# Patient Record
Sex: MALE
[De-identification: names, ages, dates, MRNs, and addresses within clinical notes are randomized per-mention and may not be internally consistent; named-entity substitution may affect disease eponyms.]

---

## 2021-12-01 DIAGNOSIS — Z00.00 ENCOUNTER FOR GENERAL ADULT MEDICAL EXAMINATION W/OUT ABNORMAL FINDINGS: ICD-10-CM

## 2021-12-03 ENCOUNTER — APPOINTMENT (OUTPATIENT)
Dept: ORTHOPEDIC SURGERY | Facility: CLINIC | Age: 61
End: 2021-12-03
Payer: COMMERCIAL

## 2021-12-03 ENCOUNTER — NON-APPOINTMENT (OUTPATIENT)
Age: 61
End: 2021-12-03

## 2021-12-03 VITALS
HEART RATE: 72 BPM | HEIGHT: 71 IN | WEIGHT: 180 LBS | SYSTOLIC BLOOD PRESSURE: 120 MMHG | DIASTOLIC BLOOD PRESSURE: 72 MMHG | BODY MASS INDEX: 25.2 KG/M2

## 2021-12-03 PROCEDURE — 99204 OFFICE O/P NEW MOD 45 MIN: CPT

## 2021-12-03 NOTE — HISTORY OF PRESENT ILLNESS
[de-identified] : This is very nice 61-year-old gentleman experiencing left hip and groin pain, which is intermittent and moderate in intensity. The pain does limits activities of daily living. Walking tolerance is somewhat reduced.  Ambulating with a cane.  Takes Tylenol which is not helping.  Is on Xarelto for history of DVT so he cannot take NSAIDs.  The pain started atraumatically and acutely 2 months ago.  He is not tried physical therapy.  The patient denies any radiation of the pain to the feet and it is not associated with numbness, tingling, or weakness.

## 2021-12-03 NOTE — PHYSICAL EXAM
[de-identified] : Patient is well nourished, well-developed, in no acute distress, with appropriate mood and affect. The patient is oriented to time, place, and person. Respirations are even and unlabored. Gait evaluation reveals a limp. There is no inguinal adenopathy. Examination of the contralateral hip shows normal range of motion, strength, no tenderness, and intact skin. The affected limb is well-perfused, shows a grossly normal motor and sensory examination. Examination of the hip shows no skin lesions. Hip motion is reduced and causes pain. FADIR is positive and STAR is positive. Stinchfield test is positive. Leg lengths are approximately equal. Both hips are stable and muscle strength is normal. Pedal pulses are palpable. [de-identified] : AP and lateral x-rays of the left hip, pelvis, and femur were brought in by the patient which I reviewed and demonstrate mild degenerative joint disease of the hip with joint space narrowing, osteophyte formation, and subchondral sclerosis.\par \par The patient brings with him an MRI of the left hip which I reviewed and read this as demonstrates increased edema in the proximal aspect of the left hip without any evidence of fracture.  No evidence of collapse of the femoral head.

## 2021-12-03 NOTE — DISCUSSION/SUMMARY
[de-identified] : This patient has proximal left femur edema on MRI and mild left hip osteoarthritis.  This diagnosis could be representative of transient osteoporosis of the hip versus osteoarthritis progression versus early onset osteonecrosis.  These were discussed with the patient.  The patient is not an appropriate candidate for surgical intervention at this time. An extensive discussion was conducted on the natural history of the disease and the variety of surgical and non-surgical options available to the patient including, but not limited to non-steroidal anti-inflammatory medications, steroid injections, physical therapy, maintenance of ideal body weight, and reduction of activity.  We will continue to monitor the patient for now and reimage him in 3 months to see if there is evidence of collapse of the femoral head consistent with osteonecrosis, progression of osteoarthritis or look for resolution of transient osteoporosis of the hip.  He understands that if it is the latter diagnosis it could take 6 to 9 months to resolve.  In the meantime he should use a cane and decreases weightbearing on that side and limit his long distance ambulation. The patient will schedule an appointment as needed in 3 months.\par

## 2022-02-15 ENCOUNTER — OUTPATIENT (OUTPATIENT)
Dept: OUTPATIENT SERVICES | Facility: HOSPITAL | Age: 62
LOS: 1 days | Discharge: HOME | End: 2022-02-15

## 2022-02-15 DIAGNOSIS — I74.9 EMBOLISM AND THROMBOSIS OF UNSPECIFIED ARTERY: ICD-10-CM

## 2022-02-17 ENCOUNTER — RESULT REVIEW (OUTPATIENT)
Age: 62
End: 2022-02-17

## 2022-02-17 ENCOUNTER — APPOINTMENT (OUTPATIENT)
Dept: HEMATOLOGY ONCOLOGY | Facility: CLINIC | Age: 62
End: 2022-02-17
Payer: COMMERCIAL

## 2022-02-17 VITALS
BODY MASS INDEX: 26.83 KG/M2 | SYSTOLIC BLOOD PRESSURE: 121 MMHG | RESPIRATION RATE: 14 BRPM | OXYGEN SATURATION: 98 % | HEIGHT: 70.24 IN | HEART RATE: 81 BPM | WEIGHT: 187.39 LBS | DIASTOLIC BLOOD PRESSURE: 79 MMHG | TEMPERATURE: 97.3 F

## 2022-02-17 DIAGNOSIS — I82.409 ACUTE EMBOLISM AND THROMBOSIS OF UNSPECIFIED DEEP VEINS OF UNSPECIFIED LOWER EXTREMITY: ICD-10-CM

## 2022-02-17 DIAGNOSIS — Z86.39 PERSONAL HISTORY OF OTHER ENDOCRINE, NUTRITIONAL AND METABOLIC DISEASE: ICD-10-CM

## 2022-02-17 DIAGNOSIS — Z82.5 FAMILY HISTORY OF ASTHMA AND OTHER CHRONIC LOWER RESPIRATORY DISEASES: ICD-10-CM

## 2022-02-17 DIAGNOSIS — Z80.0 FAMILY HISTORY OF MALIGNANT NEOPLASM OF DIGESTIVE ORGANS: ICD-10-CM

## 2022-02-17 DIAGNOSIS — Z85.828 PERSONAL HISTORY OF OTHER MALIGNANT NEOPLASM OF SKIN: ICD-10-CM

## 2022-02-17 DIAGNOSIS — Z87.19 PERSONAL HISTORY OF OTHER DISEASES OF THE DIGESTIVE SYSTEM: ICD-10-CM

## 2022-02-17 DIAGNOSIS — E78.00 PURE HYPERCHOLESTEROLEMIA, UNSPECIFIED: ICD-10-CM

## 2022-02-17 DIAGNOSIS — D68.59 OTHER PRIMARY THROMBOPHILIA: ICD-10-CM

## 2022-02-17 DIAGNOSIS — Z87.442 PERSONAL HISTORY OF URINARY CALCULI: ICD-10-CM

## 2022-02-17 DIAGNOSIS — Z87.438 PERSONAL HISTORY OF OTHER DISEASES OF MALE GENITAL ORGANS: ICD-10-CM

## 2022-02-17 DIAGNOSIS — K90.0 CELIAC DISEASE: ICD-10-CM

## 2022-02-17 DIAGNOSIS — Z78.9 OTHER SPECIFIED HEALTH STATUS: ICD-10-CM

## 2022-02-17 DIAGNOSIS — Z82.49 FAMILY HISTORY OF ISCHEMIC HEART DISEASE AND OTHER DISEASES OF THE CIRCULATORY SYSTEM: ICD-10-CM

## 2022-02-17 LAB
BASOPHILS # BLD AUTO: 0.03 K/UL — SIGNIFICANT CHANGE UP (ref 0–0.2)
BASOPHILS NFR BLD AUTO: 0.7 % — SIGNIFICANT CHANGE UP (ref 0–2)
EOSINOPHIL # BLD AUTO: 0.14 K/UL — SIGNIFICANT CHANGE UP (ref 0–0.5)
EOSINOPHIL NFR BLD AUTO: 3.3 % — SIGNIFICANT CHANGE UP (ref 0–6)
HCT VFR BLD CALC: 46.2 % — SIGNIFICANT CHANGE UP (ref 39–50)
HGB BLD-MCNC: 15.9 G/DL — SIGNIFICANT CHANGE UP (ref 13–17)
IMM GRANULOCYTES NFR BLD AUTO: 0.2 % — SIGNIFICANT CHANGE UP (ref 0–1.5)
LYMPHOCYTES # BLD AUTO: 0.95 K/UL — LOW (ref 1–3.3)
LYMPHOCYTES # BLD AUTO: 22.6 % — SIGNIFICANT CHANGE UP (ref 13–44)
MCHC RBC-ENTMCNC: 32.6 PG — SIGNIFICANT CHANGE UP (ref 27–34)
MCHC RBC-ENTMCNC: 34.4 G/DL — SIGNIFICANT CHANGE UP (ref 32–36)
MCV RBC AUTO: 94.7 FL — SIGNIFICANT CHANGE UP (ref 80–100)
MONOCYTES # BLD AUTO: 0.36 K/UL — SIGNIFICANT CHANGE UP (ref 0–0.9)
MONOCYTES NFR BLD AUTO: 8.6 % — SIGNIFICANT CHANGE UP (ref 2–14)
NEUTROPHILS # BLD AUTO: 2.72 K/UL — SIGNIFICANT CHANGE UP (ref 1.8–7.4)
NEUTROPHILS NFR BLD AUTO: 64.6 % — SIGNIFICANT CHANGE UP (ref 43–77)
NRBC # BLD: 0 /100 WBCS — SIGNIFICANT CHANGE UP (ref 0–0)
PLATELET # BLD AUTO: 136 K/UL — LOW (ref 150–400)
RBC # BLD: 4.88 M/UL — SIGNIFICANT CHANGE UP (ref 4.2–5.8)
RBC # FLD: 11.6 % — SIGNIFICANT CHANGE UP (ref 10.3–14.5)
WBC # BLD: 4.21 K/UL — SIGNIFICANT CHANGE UP (ref 3.8–10.5)
WBC # FLD AUTO: 4.21 K/UL — SIGNIFICANT CHANGE UP (ref 3.8–10.5)

## 2022-02-17 PROCEDURE — 99204 OFFICE O/P NEW MOD 45 MIN: CPT

## 2022-02-23 LAB
DNA PLOIDY SPEC FC-IMP: NORMAL
PTR INTERP: NORMAL

## 2022-02-26 PROBLEM — Z78.9 SOCIAL ALCOHOL USE: Status: ACTIVE | Noted: 2022-02-26

## 2022-02-26 PROBLEM — Z82.49 FAMILY HISTORY OF CARDIAC DISORDER: Status: ACTIVE | Noted: 2022-02-26

## 2022-02-26 PROBLEM — Z86.39 HISTORY OF HYPERCHOLESTEROLEMIA: Status: RESOLVED | Noted: 2022-02-26 | Resolved: 2022-02-26

## 2022-02-26 PROBLEM — Z80.0 FAMILY HISTORY OF COLORECTAL CANCER: Status: ACTIVE | Noted: 2022-02-26

## 2022-02-26 PROBLEM — Z87.438 HISTORY OF BENIGN PROSTATIC HYPERPLASIA: Status: RESOLVED | Noted: 2022-02-26 | Resolved: 2022-02-26

## 2022-02-26 PROBLEM — Z82.5 FAMILY HISTORY OF CHRONIC OBSTRUCTIVE PULMONARY DISEASE: Status: ACTIVE | Noted: 2022-02-26

## 2022-02-26 PROBLEM — K90.0 ADULT CELIAC DISEASE: Status: RESOLVED | Noted: 2022-02-26 | Resolved: 2022-02-26

## 2022-02-26 PROBLEM — Z85.828: Status: RESOLVED | Noted: 2022-02-26 | Resolved: 2022-02-26

## 2022-02-26 RX ORDER — DOXAZOSIN 1 MG/1
1 TABLET ORAL DAILY
Refills: 0 | Status: ACTIVE | COMMUNITY

## 2022-02-26 RX ORDER — RIVAROXABAN 10 MG/1
10 TABLET, FILM COATED ORAL
Refills: 0 | Status: ACTIVE | COMMUNITY

## 2022-02-26 RX ORDER — ATORVASTATIN CALCIUM 80 MG/1
TABLET, FILM COATED ORAL DAILY
Refills: 0 | Status: ACTIVE | COMMUNITY

## 2022-03-02 PROBLEM — Z87.438 HISTORY OF BPH: Status: ACTIVE | Noted: 2022-03-02

## 2022-03-02 PROBLEM — Z87.19 HISTORY OF CELIAC DISEASE: Status: ACTIVE | Noted: 2022-03-02

## 2022-03-02 PROBLEM — D68.59 HYPERCOAGULABLE STATE: Status: ACTIVE | Noted: 2022-03-02

## 2022-03-02 PROBLEM — I82.409 DVT (DEEP VENOUS THROMBOSIS): Status: ACTIVE | Noted: 2022-02-17

## 2022-03-02 PROBLEM — E78.00 HYPERCHOLESTEROLEMIA: Status: ACTIVE | Noted: 2022-03-02

## 2022-03-02 PROBLEM — Z87.442 HISTORY OF NEPHROLITHIASIS: Status: RESOLVED | Noted: 2022-02-26 | Resolved: 2022-03-02

## 2022-03-03 NOTE — PHYSICAL EXAM
[Fully active, able to carry on all pre-disease performance without restriction] : Status 0 - Fully active, able to carry on all pre-disease performance without restriction [Normal] : affect appropriate [de-identified] : No cervical, axillary or inguinal LAD noted [de-identified] : multiple moles

## 2022-03-03 NOTE — HISTORY OF PRESENT ILLNESS
[de-identified] : Initial Consultation on 2022\par Referred by: Self- referral\par CC: DVT, hypercoagulable state, assess need for continued anti-coagulation\par \par HPI:\par 61 year old man with history of Celiac Disease, nephrolithiasis, basal cell carcinoma, BPH, hypercholesterolemia here for evaluation of hypercoagulable state and to assess need for continued anticoagulation.  Patient reports that he has been on anti-coagulation since .  Patient developed his first LLE DVT in .  He developed left calf pain/swelling and was evaluated in ED.  He denied any known precipitating factors; he denies any long car/airplane travel, illness or trauma to the leg.  He denied any concurrent chest pain or SOB and therefore no chest imaging was done.  He was given Lovenox injections and then followed up with Hematology (either Dr. Ash Bryant or Dr. Mendenhall).  He was transitioned to Warfarin to complete 3-6 months of anti-coagulation.  He does not recall if any hypercoagulable laboratory work-up was done.  Hematology recommended Aspirin after Warfarin was completed but patient did not take it.  \par \par In 2014, patient developed RLE pain/swelling.  He denied any precipitating risk factor.  Doppler ultrasound showed RLE superficial thrombosis right thigh.  Patient was re-evaluated by hematology and advised to continue anti-coagulation.  Patient is currently on Xarelto daily; tolerating well.  \par \par Patient feels good overall.  He reports a good energy level and appetite without recent weight loss.  He has chronic hematuria- followed by .  He recently went to orthopedics for left hip pain, which has since resolved.  Patient denies any fever/chills, recent infections, CP, SOB, palpitations, abdominal pain, n/v/d, bloody/black stools, frequent headaches or dizziness.\par \par PMHx:\par Celiac Disease\par Nephrolithiasis\par BPH\par Hypercholesterolemia\par Basal cell carcinoma left elbow s/p Moh's resection\par Left hip pain- ? transient osteoporosis- resolved\par \par PSHx:\par Urethroplasty with buccal graft  for urethral strictures\par Right hand fasciotomy secondary to Dupytrens contracture\par Nephrostomy tube with removal of kidney stones\par \par Hospitalizations:\par Nephrolithiasis/sepsis 2019 at [facility]\par \par Medications:\par See List.  Medications reconciled\par Xarelto 10mg po daily\par \par Allergies:\par NKDA\par Gluten allergy\par \par Social History:\par . Has 3 children.  \par Works as a . \par Denies smoking\par Drinks 2-4 alcoholic beverages per week\par [place] in [place] Parents are from [place] \par \par Family History:\par Mother  in her 70's from colon cancer\par Father  from heart disease, AICD, COPD\par Brother alive with  LLE DVT after airplane travel\par Brother alive and well\par Sister alive and well\par 3 children alive and well\par Family history significant for brother with h/o DVT. \par \par Healthcare Maintenance:\par Primary care doctor- Dr. Mile Kendall- last seen 2022 \par Last colonoscopy- - polyps- benign\par Last endoscopy- 3 years ago- normal\par Denies recent chest x-ray. \par Received 2 doses of COVID vaccine\par History of COVID infection 2021- cold symptoms X 3 weeks. \par Follows with  health organization\par Dr. Baez- rheumatology\par Follows with orthopedics\par Sees - Dr. Gary Goldberg- had 3 prostate biopsies which were negative.  Follows elevated PSA.  Last visit 2022. \par Sees dermatology regularly [de-identified] : Initial Visit

## 2022-03-03 NOTE — ASSESSMENT
[FreeTextEntry1] : 61 year old man with history of Celiac Disease, nephrolithiasis, basal cell carcinoma, BPH, hypercholesterolemia here for evaluation of hypercoagulable state and to assess need for continued anticoagulation.  Patient reports that he has been on anti-coagulation since 2012.  Patient developed his first unprovoked LLE DVT in 2012.  In 12/2014, he developed unprovoked RLE superficial thrombosis.  He is currently on Xarelto daily; tolerating well.  \par \par Plan:\par Will check CBC, Prothrombin gene mutation and Factor V Leiden for purposes of informing family members if positive. \par Recommend indefinite anticoagulation. \par Will obtain old records from prior hematologists- prior ultrasounds and prior hematology work-up. \par Return visit in 1 month. \par Case and management discussed with Dr. Lexis Gomez.

## 2022-03-04 ENCOUNTER — APPOINTMENT (OUTPATIENT)
Dept: ORTHOPEDIC SURGERY | Facility: CLINIC | Age: 62
End: 2022-03-04
Payer: COMMERCIAL

## 2022-03-04 VITALS
DIASTOLIC BLOOD PRESSURE: 77 MMHG | SYSTOLIC BLOOD PRESSURE: 123 MMHG | WEIGHT: 187 LBS | HEART RATE: 71 BPM | HEIGHT: 70 IN | BODY MASS INDEX: 26.77 KG/M2

## 2022-03-04 DIAGNOSIS — M25.552 PAIN IN LEFT HIP: ICD-10-CM

## 2022-03-04 PROCEDURE — 99214 OFFICE O/P EST MOD 30 MIN: CPT

## 2022-03-04 NOTE — HISTORY OF PRESENT ILLNESS
[de-identified] : This is very nice 61-year-old gentleman experiencing left hip and groin pain, which is improving.  It is now mild in intensity.  It was severe when I saw him last.  I diagnosed with transient osteoporosis of the hip.  Decrease his activities help.  Does have groin pain with hyperflexion of the hip when he is putting on shoes and socks but overall much better.  Still cannot take NSAIDs because he is still on Xarelto for DVT history and his hematologist will take him off of it.  He is now ambulating without a cane but he was at first after I saw him.  He is still not tried physical therapy.  The patient denies any radiation of the pain to the feet and it is not associated with numbness, tingling, or weakness.

## 2022-03-04 NOTE — DISCUSSION/SUMMARY
[de-identified] : This patient has left proximal femur transient osteoporosis and mild left hip osteoarthritis.  Continue nonoperative management as he is improving.  He cannot take NSAIDs he can take Tylenol.  Otherwise I would have prescribed him meloxicam.  He can be weight-bear as tolerated.  Physical therapy prescribed.  Follow-up with me in 3 months.  If he is not better then will consider reMRI in the hip.

## 2022-03-04 NOTE — PHYSICAL EXAM
[de-identified] : Patient is well nourished, well-developed, in no acute distress, with appropriate mood and affect. The patient is oriented to time, place, and person. Respirations are even and unlabored. Gait evaluation reveals a limp. There is no inguinal adenopathy. Examination of the contralateral hip shows normal range of motion, strength, no tenderness, and intact skin. The affected limb is well-perfused, shows a grossly normal motor and sensory examination. Examination of the hip shows no skin lesions. Hip motion is reduced and causes pain. FADIR is mildly positive and STAR is negative.  Stinchfield test is positive. Leg lengths are approximately equal. Both hips are stable and muscle strength is normal. Pedal pulses are palpable.

## 2022-03-11 ENCOUNTER — APPOINTMENT (OUTPATIENT)
Dept: HEMATOLOGY ONCOLOGY | Facility: CLINIC | Age: 62
End: 2022-03-11